# Patient Record
Sex: MALE | Race: OTHER | NOT HISPANIC OR LATINO | URBAN - METROPOLITAN AREA
[De-identification: names, ages, dates, MRNs, and addresses within clinical notes are randomized per-mention and may not be internally consistent; named-entity substitution may affect disease eponyms.]

---

## 2017-02-27 ENCOUNTER — EMERGENCY (EMERGENCY)
Facility: HOSPITAL | Age: 37
LOS: 0 days | Discharge: HOME | End: 2017-02-27

## 2017-06-27 DIAGNOSIS — J11.1 INFLUENZA DUE TO UNIDENTIFIED INFLUENZA VIRUS WITH OTHER RESPIRATORY MANIFESTATIONS: ICD-10-CM

## 2017-06-27 DIAGNOSIS — R50.9 FEVER, UNSPECIFIED: ICD-10-CM

## 2017-06-27 DIAGNOSIS — R42 DIZZINESS AND GIDDINESS: ICD-10-CM

## 2019-01-17 PROBLEM — Z00.00 ENCOUNTER FOR PREVENTIVE HEALTH EXAMINATION: Status: ACTIVE | Noted: 2019-01-17

## 2019-01-28 ENCOUNTER — APPOINTMENT (OUTPATIENT)
Dept: UROLOGY | Facility: CLINIC | Age: 39
End: 2019-01-28

## 2023-01-17 ENCOUNTER — NEW PATIENT COMPREHENSIVE (OUTPATIENT)
Dept: URBAN - METROPOLITAN AREA CLINIC 10 | Facility: CLINIC | Age: 43
End: 2023-01-17

## 2023-01-17 DIAGNOSIS — H31.012: ICD-10-CM

## 2023-01-17 DIAGNOSIS — H01.023: ICD-10-CM

## 2023-01-17 DIAGNOSIS — H01.026: ICD-10-CM

## 2023-01-17 DIAGNOSIS — H00.16: ICD-10-CM

## 2023-01-17 DIAGNOSIS — Z13.5: ICD-10-CM

## 2023-01-17 PROCEDURE — 92134 CPTRZ OPH DX IMG PST SGM RTA: CPT

## 2023-01-17 PROCEDURE — MISCOPTOS MISCELLANEOUS, OPTOS

## 2023-01-17 PROCEDURE — 99204 OFFICE O/P NEW MOD 45 MIN: CPT

## 2023-01-17 ASSESSMENT — VISUAL ACUITY
OD_SC: 20/20-2
OS_SC: 20/30

## 2023-01-17 ASSESSMENT — KERATOMETRY
OS_AXISANGLE_DEGREES: 22
OD_AXISANGLE2_DEGREES: 88
OS_AXISANGLE2_DEGREES: 112
OD_AXISANGLE_DEGREES: 178
OD_K2POWER_DIOPTERS: 44.00
OS_K1POWER_DIOPTERS: 42.25
OS_K2POWER_DIOPTERS: 43.50
OD_K1POWER_DIOPTERS: 42.75

## 2023-01-17 ASSESSMENT — TONOMETRY
OD_IOP_MMHG: 14
OS_IOP_MMHG: 14

## 2023-01-31 ENCOUNTER — CONSULTATION (OUTPATIENT)
Dept: URBAN - METROPOLITAN AREA CLINIC 10 | Facility: CLINIC | Age: 43
End: 2023-01-31

## 2023-01-31 DIAGNOSIS — H01.023: ICD-10-CM

## 2023-01-31 DIAGNOSIS — H31.012: ICD-10-CM

## 2023-01-31 DIAGNOSIS — Z13.5: ICD-10-CM

## 2023-01-31 DIAGNOSIS — H01.026: ICD-10-CM

## 2023-01-31 DIAGNOSIS — H00.16: ICD-10-CM

## 2023-01-31 PROCEDURE — 92012 INTRM OPH EXAM EST PATIENT: CPT | Mod: 25

## 2023-01-31 PROCEDURE — 67800 REMOVE EYELID LESION: CPT

## 2023-01-31 ASSESSMENT — KERATOMETRY
OS_AXISANGLE2_DEGREES: 112
OS_K1POWER_DIOPTERS: 42.25
OS_K2POWER_DIOPTERS: 43.50
OD_K2POWER_DIOPTERS: 44.00
OS_AXISANGLE_DEGREES: 22
OD_K1POWER_DIOPTERS: 42.75
OD_AXISANGLE_DEGREES: 178
OD_AXISANGLE2_DEGREES: 88

## 2023-01-31 ASSESSMENT — VISUAL ACUITY
OD_SC: 20/20-1
OS_SC: 20/25-1

## 2024-04-18 ENCOUNTER — APPOINTMENT (OUTPATIENT)
Dept: ORTHOPEDIC SURGERY | Facility: CLINIC | Age: 44
End: 2024-04-18
Payer: COMMERCIAL

## 2024-04-18 VITALS — HEIGHT: 70 IN

## 2024-04-18 DIAGNOSIS — M72.2 PLANTAR FASCIAL FIBROMATOSIS: ICD-10-CM

## 2024-04-18 PROCEDURE — 99204 OFFICE O/P NEW MOD 45 MIN: CPT

## 2024-04-18 RX ORDER — FAMOTIDINE 20 MG/1
20 TABLET, FILM COATED ORAL
Qty: 60 | Refills: 0 | Status: ACTIVE | COMMUNITY
Start: 2024-04-18 | End: 1900-01-01

## 2024-04-18 RX ORDER — IBUPROFEN 800 MG/1
800 TABLET, FILM COATED ORAL
Qty: 60 | Refills: 2 | Status: ACTIVE | COMMUNITY
Start: 2024-04-18 | End: 1900-01-01

## 2024-04-18 NOTE — IMAGING
[de-identified] : Pleasant early middle-aged gentleman walks into my office no distress.  He walks with no limp or antalgia.  Physical examination: Left shoulder: Unremarkable examination.  Normal speeds test.  Normal Breckinridge's test.  Normal impingement sign.  Normal Brasher test.  Normal lift off test.  Negative apprehension test.  Active forward flexion is symmetric to 170 degrees.  Abduction symmetric 165 degrees.  With the shoulder held at 90 degrees of abduction external rotation 85 degrees internal rotation 70 degrees symmetric.  No axillary lymph nodes.  Some tenderness palpation over the angle of his scapula but most of the tenderness is medial to the scapula if any.  No tenderness along the acromial arch or clavicle or AC joint.  Negative cross chest test.  No undue swelling.  Left hip: No tenderness palpation over his inguinal crease.  No pain with forced internal/external rotation of his hip joint at full extension 90 degrees of flexion.  No lymph nodes inguinal crease.  Flexion to 100 degrees without pain.  Provocative testing iliotibial band elicits discomfort.  Tenderness to palpation over the trochanteric bursa.  Left foot: Tenderness palpation along the plantar fascia along the arch of the patient's foot.  No tenderness along the navicular nor posterior aspects of the calcaneus.  Minimal tenderness over the calcaneal tuberosity.  No dorsal tenderness along the midfoot.  Arch maintained.  Ankle motion full.  Good capillary refill.  No callosities ulcers.  No undue swelling.  Normal sensation.

## 2024-04-18 NOTE — ASSESSMENT
[FreeTextEntry1] : 43-year-old gentleman with several complaints.  I think his medial shoulder pain is as the neurologist suggested probably some irritation or impingement of sensory nerve either coming from his lower cervical spine or thoracic spine.  I would defer to neurology about management of this.  Perhaps physical therapy will helpful but will defer to them to write this prescription.  He does not have anything intrinsically wrong with his shoulder by clinical exam.  Regarding his left hip pain this is really lateral hip pain and is most consistent with trochanteric bursitis and iliotibial band syndrome.  Talked about treatment options.  I am giving him a prescription for NSAIDs with famotidine and physical therapy.  Will have him follow-up in 3 months time.  If the pain persist can consider cortisone injection.  His foot pain is most consistent with a plantar fasciitis.  I am not a big fan of cortisone injections in the area of discomfort.  I explained to him the use of Achilles tendon stretches and plantar fascial massage and are shown in both these activities.  I have also given a prescription for physical therapy for these stretches.  Will have him follow-up in my office in 3 months time.  Will obtain radiographs of the left hip on follow-up.  We can also obtain bilateral weightbearing foot x-rays if he is still having foot pain on follow-up.

## 2024-04-18 NOTE — HISTORY OF PRESENT ILLNESS
[de-identified] : 43-year-old  wears regular shoewear for work and goes from site to site has had the better part of 6 months to a year of left posterior shoulder and neck pain.  This pain is occasionally paresthetic in nature.  He has been worked up with an MRI of his cervical spine and has seen a neurologist.  He was told that he is foraminal encroachment with pinching of nerves largely account for this pain.  Patient has had lateral left hip and thigh pain starting probably about a year ago but most significantly over the last couple of months.  Describes this pain as achy and sometimes paresthetic.  Pain is worse at night.  He is tried Advil which mitigates but does not alleviate the pain.  He also has left plantar foot pain over about the same period of time.  He has seen a podiatrist who suggested possible plantar fasciitis for which he recommended steroids.  May have some unsteadiness of gait.  No unexplained weight loss or weight gain.  No trouble swallowing.  No fevers chills night sweats or other constitutional symptoms.  Does not walk with a cane or assistive device.  He is not dropping objects or having difficulty working on a keyboard.  Past medical history: Hypertension  Medications: Amlodipine/losartan  Allergies: Penicillin (child-reaction unknown)  Social: , , lives with wife no cigarette smoke social EtOH

## 2024-04-18 NOTE — REASON FOR VISIT
[FreeTextEntry2] : Presents for evaluation of chronic neck and posterior left shoulder pain, left lateral thigh pain and left foot pain.

## 2024-05-02 ENCOUNTER — APPOINTMENT (OUTPATIENT)
Dept: ORTHOPEDIC SURGERY | Facility: CLINIC | Age: 44
End: 2024-05-02
Payer: COMMERCIAL

## 2024-05-02 PROCEDURE — 73630 X-RAY EXAM OF FOOT: CPT | Mod: 50

## 2024-05-02 PROCEDURE — 73502 X-RAY EXAM HIP UNI 2-3 VIEWS: CPT

## 2024-05-02 NOTE — ASSESSMENT
[FreeTextEntry1] : 43-year-old gentleman.  Although he does have some mild arthritic changes of the left hip I do not think this is the major  of his pain.  Although he may also have some trochanteric bursitis and think the underlying problem is his generalized stiffness and for that I will give him physical therapy.  Physical therapy to focus on iliotibial band stretching and hamstring stretching hip abductor and extensor stretching quadricep stretching and generalized stretching as well as core strengthening exercises.  We can also help arrange for an appointment with neurology to obtain the EMGs.  He will follow-up in my office after he has been evaluated by neurology and after has had some physical therapy.

## 2024-05-02 NOTE — HISTORY OF PRESENT ILLNESS
[de-identified] : 43-year-old  returns for interval follow-up.  Previously seen is felt to have radicular low back pain.  Was seen by neurosurgeon retained MRIs all of which were normal.  He is neurosurgeon recommended further workup with neurology for EMGs of the neck and lower back.  Patient is can to be complaints of pain radiating down the back of his legs to his feet with paresthesias and occasional lateral left hip pain.  Although he does have some groin pain from time to time this is not his major complaint.  He walks without a cane or assistive device.

## 2024-05-02 NOTE — IMAGING
[de-identified] : Mae middle-age gentleman walks into my office with no antalgia.  Physical examination: Left hip: No tenderness palpation inguinal crease.  Minimal if any tenderness of the trochanteric bursa.  He has extremely tight hamstrings.  His iliotibial band is tight.  No pain with forced internal/external rotation of joint at full extension or 90 degrees of flexion with neutral abduction.  No lymph nodes inguinal crease.  Radiographs: Left hip (AP, lateral): Mild joint space narrowing of the left hip.  No subchondral sclerotic or cystic changes.  No para-articular erosive changes.  No marginal osteophytes.

## 2024-05-17 ENCOUNTER — APPOINTMENT (OUTPATIENT)
Dept: NEUROLOGY | Facility: CLINIC | Age: 44
End: 2024-05-17
Payer: COMMERCIAL

## 2024-05-17 PROCEDURE — 95912 NRV CNDJ TEST 11-12 STUDIES: CPT

## 2024-05-17 PROCEDURE — 95886 MUSC TEST DONE W/N TEST COMP: CPT

## 2024-05-23 ENCOUNTER — APPOINTMENT (OUTPATIENT)
Dept: ORTHOPEDIC SURGERY | Facility: CLINIC | Age: 44
End: 2024-05-23
Payer: COMMERCIAL

## 2024-05-23 DIAGNOSIS — R07.81 PLEURODYNIA: ICD-10-CM

## 2024-05-23 DIAGNOSIS — M25.552 PAIN IN LEFT HIP: ICD-10-CM

## 2024-05-23 DIAGNOSIS — G89.29 PAIN IN LEFT HIP: ICD-10-CM

## 2024-05-23 PROCEDURE — 99212 OFFICE O/P EST SF 10 MIN: CPT

## 2024-05-23 NOTE — HISTORY OF PRESENT ILLNESS
[de-identified] : 43-year-old gentleman previously seen by me for radicular low back pain.  Sent for EMGs.  Also receiving physical therapy.  He finds physical therapy very helpful.  Has noted some improvement his low back pain.  During this time he is also had a resumption of some posterior lateral left rib pain.  This is positional and achy in nature but occasionally will occur without change in position.  Quality of the pain suggest further entrapment nerves.  Denies any respiratory distress.  No bowel or bladder disturbances or unremitting night pain.

## 2024-05-23 NOTE — REASON FOR VISIT
[FreeTextEntry2] : Returns for follow-up of radicular low back pain; resumption of prior posterior left rib pain

## 2024-05-23 NOTE — ASSESSMENT
[FreeTextEntry1] : 43-year-old gentleman with radicular low back pain without clear evidence of entrapped nerve.  Clinical examination suggest otherwise.  Recommend continued physical therapy lower back.  Will have him continue with this and have him follow-up for his low back issues in 3 months.  Also has complaints of thoracic pain.  Suspect Condor carditis.  Because of location will obtain a CAT scan of his chest to rule out any pulmonary or left renal pathology.  Will see him back after he gets the CAT scan.  Will review the findings with musculoskeletal radiology.

## 2024-05-23 NOTE — IMAGING
[de-identified] : Pleasant middle-age gentleman sits comfortably in office.  Physical examination: Lumbar spine: Hamstring tightness is improved.  Motor strength remains 5 out of 5.  Less paraspinal spasming on the left side.  No bony tenderness.  Range of motion improving.  Thoracic spine: No focal tenderness along the bony prominences.  No tenderness along the rib cage with the patient localizes tenderness below his scapular angle   EMG report lumbar spine reviewed.  No evidence of entrapment.

## 2024-06-14 ENCOUNTER — APPOINTMENT (OUTPATIENT)
Dept: NEUROLOGY | Facility: CLINIC | Age: 44
End: 2024-06-14
Payer: COMMERCIAL

## 2024-06-14 PROCEDURE — 95886 MUSC TEST DONE W/N TEST COMP: CPT

## 2024-06-14 PROCEDURE — 95912 NRV CNDJ TEST 11-12 STUDIES: CPT

## 2024-07-15 ENCOUNTER — APPOINTMENT (OUTPATIENT)
Dept: PAIN MANAGEMENT | Facility: CLINIC | Age: 44
End: 2024-07-15
Payer: OTHER MISCELLANEOUS

## 2024-07-15 DIAGNOSIS — M46.1 SACROILIITIS, NOT ELSEWHERE CLASSIFIED: ICD-10-CM

## 2024-07-15 DIAGNOSIS — M79.18 MYALGIA, OTHER SITE: ICD-10-CM

## 2024-07-15 PROCEDURE — 99204 OFFICE O/P NEW MOD 45 MIN: CPT

## 2024-07-16 ENCOUNTER — APPOINTMENT (OUTPATIENT)
Dept: ORTHOPEDIC SURGERY | Facility: CLINIC | Age: 44
End: 2024-07-16

## 2024-07-18 ENCOUNTER — CONSULTATION (OUTPATIENT)
Dept: URBAN - METROPOLITAN AREA CLINIC 10 | Facility: CLINIC | Age: 44
End: 2024-07-18

## 2024-07-18 DIAGNOSIS — H00.16: ICD-10-CM

## 2024-07-18 DIAGNOSIS — D49.2: ICD-10-CM

## 2024-07-18 DIAGNOSIS — H01.023: ICD-10-CM

## 2024-07-18 DIAGNOSIS — H02.879: ICD-10-CM

## 2024-07-18 DIAGNOSIS — H01.026: ICD-10-CM

## 2024-07-18 PROCEDURE — 92285 EXTERNAL OCULAR PHOTOGRAPHY: CPT

## 2024-07-18 PROCEDURE — 99204 OFFICE O/P NEW MOD 45 MIN: CPT

## 2024-07-18 ASSESSMENT — VISUAL ACUITY
OS_SC: 20/20-2
OD_SC: 20/20-1

## 2024-08-12 ENCOUNTER — APPOINTMENT (OUTPATIENT)
Dept: PAIN MANAGEMENT | Facility: CLINIC | Age: 44
End: 2024-08-12
Payer: OTHER MISCELLANEOUS

## 2024-08-12 DIAGNOSIS — M25.519 PAIN IN UNSPECIFIED SHOULDER: ICD-10-CM

## 2024-08-12 DIAGNOSIS — M54.12 RADICULOPATHY, CERVICAL REGION: ICD-10-CM

## 2024-08-12 PROCEDURE — 20552 NJX 1/MLT TRIGGER POINT 1/2: CPT | Mod: LT

## 2024-08-12 PROCEDURE — 99214 OFFICE O/P EST MOD 30 MIN: CPT | Mod: 25

## 2024-08-12 NOTE — DISCUSSION/SUMMARY
[de-identified] : A discussion regarding available pain management treatment options occurred with the patient. These included interventional, rehabilitative, pharmacological, and alternative modalities. We will proceed with the following:   Interventional treatment options: 1. MRI of the left shoulder ordered to rule out any internal derangement.  2. Patient has documented myofascial spasms and trigger points in the muscle. Patient has trialed rehab (Home exercise, physical therapy or chiropractic care) and medications. We will proceed with a series of 1-3 trapezius and thoracic trigger points in office today. If 50% or greater relief for 6-8 weeks another can be repeated vs Botox.  Risks with the procedure such as bleeding and infection was discussed in detail.   Rehabilitative options: - c/w chiropractic therapy.  - Physical therapy of the cervical spine 2-3 times a week for 4-8 weeks stressing a home exercise program of walking, shoulder griddle strengthening, swimming, elliptical , recumbent bike, Kiko chi and Yoga. Use things that heat like hot shower or icy heat before rehab, exercising and at the beginning of the day, and ice (ice in a bag never directly on the skin) after activity and at the end of the day. (New script given at today's visit) - c/w participation in active HEP as discussed and printed. I gave the patient a list of home exercises to do for pain reduction and overall improvement in functional status including but not limited to active neck rotation, active neck side bend, neck flexion, neck extension, chin tuck, scalene stretch, isometric neck flexion, isometric neck extension, isometric neck side bend, head lift/neck curl, head lift/neck side bend, neck extension on hands and knees, and scapula squeeze.  Complementary treatment options: - Patient was advised to stay away from any heavy lifting. If needed, he was advised to squat and not bend forward. - Physician directed activity and lifestyle modifications.  Follow up in 4-6 weeks for reassessment.  I, Miguelangel Martinez, attest that this documentation has been prepared under the direction and in the presence of Provider Medhat Elise, DO The documentation recorded by the scribe, in my presence, accurately reflects the service I personally performed, and the decisions made by me with my edits as appropriate.   Best Regards, Medhat Elise D.O.

## 2024-08-13 ENCOUNTER — APPOINTMENT (OUTPATIENT)
Dept: ORTHOPEDIC SURGERY | Facility: CLINIC | Age: 44
End: 2024-08-13

## 2024-09-05 ENCOUNTER — APPOINTMENT (OUTPATIENT)
Dept: PAIN MANAGEMENT | Facility: CLINIC | Age: 44
End: 2024-09-05
Payer: OTHER MISCELLANEOUS

## 2024-09-05 DIAGNOSIS — M54.81 OCCIPITAL NEURALGIA: ICD-10-CM

## 2024-09-05 PROCEDURE — 99214 OFFICE O/P EST MOD 30 MIN: CPT | Mod: 25

## 2024-09-05 PROCEDURE — 20552 NJX 1/MLT TRIGGER POINT 1/2: CPT | Mod: LT

## 2024-09-05 NOTE — DISCUSSION/SUMMARY
[de-identified] : A discussion regarding available pain management treatment options occurred with the patient. These included interventional, rehabilitative, pharmacological, and alternative modalities. We will proceed with the following:   Interventional treatment options: 1. Patient has documented myofascial spasms and trigger points in the muscle. Patient has trialed rehab (Home exercise, physical therapy or chiropractic care) and medications. We will proceed with a series of 1-3 thoracic trigger points in office today. If 50% or greater relief for 6-8 weeks another can be repeated vs Botox.  Risks with the procedure such as bleeding and infection was discussed in detail.   2. Patient will proceed with a left occipital nerve block injection. Risks with the procedure such as bleeding and infection was discussed in detail.  Rehabilitative options: - c/w chiropractic therapy.  - Physical therapy of the cervical spine 2-3 times a week for 4-8 weeks stressing a home exercise program of walking, shoulder griddle strengthening, swimming, elliptical , recumbent bike, Kiko chi and Yoga. Use things that heat like hot shower or icy heat before rehab, exercising and at the beginning of the day, and ice (ice in a bag never directly on the skin) after activity and at the end of the day. (New script given at today's visit) - c/w participation in active HEP as discussed and printed. I gave the patient a list of home exercises to do for pain reduction and overall improvement in functional status including but not limited to active neck rotation, active neck side bend, neck flexion, neck extension, chin tuck, scalene stretch, isometric neck flexion, isometric neck extension, isometric neck side bend, head lift/neck curl, head lift/neck side bend, neck extension on hands and knees, and scapula squeeze.  Complementary treatment options: - Patient was advised to stay away from any heavy lifting. If needed, he was advised to squat and not bend forward. - Physician directed activity and lifestyle modifications.  Follow up in 4-6 weeks for reassessment.  I, Miguelangel Martinez, attest that this documentation has been prepared under the direction and in the presence of Provider Medhat Elise, DO The documentation recorded by the scribe, in my presence, accurately reflects the service I personally performed, and the decisions made by me with my edits as appropriate.   Best Regards, Medhat Elise D.O.

## 2024-09-05 NOTE — DISCUSSION/SUMMARY
[de-identified] : A discussion regarding available pain management treatment options occurred with the patient. These included interventional, rehabilitative, pharmacological, and alternative modalities. We will proceed with the following:   Interventional treatment options: 1. Patient has documented myofascial spasms and trigger points in the muscle. Patient has trialed rehab (Home exercise, physical therapy or chiropractic care) and medications. We will proceed with a series of 1-3 thoracic trigger points in office today. If 50% or greater relief for 6-8 weeks another can be repeated vs Botox.  Risks with the procedure such as bleeding and infection was discussed in detail.   2. Patient will proceed with a left occipital nerve block injection. Risks with the procedure such as bleeding and infection was discussed in detail.  Rehabilitative options: - c/w chiropractic therapy.  - Physical therapy of the cervical spine 2-3 times a week for 4-8 weeks stressing a home exercise program of walking, shoulder griddle strengthening, swimming, elliptical , recumbent bike, Kiko chi and Yoga. Use things that heat like hot shower or icy heat before rehab, exercising and at the beginning of the day, and ice (ice in a bag never directly on the skin) after activity and at the end of the day. (New script given at today's visit) - c/w participation in active HEP as discussed and printed. I gave the patient a list of home exercises to do for pain reduction and overall improvement in functional status including but not limited to active neck rotation, active neck side bend, neck flexion, neck extension, chin tuck, scalene stretch, isometric neck flexion, isometric neck extension, isometric neck side bend, head lift/neck curl, head lift/neck side bend, neck extension on hands and knees, and scapula squeeze.  Complementary treatment options: - Patient was advised to stay away from any heavy lifting. If needed, he was advised to squat and not bend forward. - Physician directed activity and lifestyle modifications.  Follow up in 4-6 weeks for reassessment.  I, Miguelangel Martinez, attest that this documentation has been prepared under the direction and in the presence of Provider Medhat Elise, DO The documentation recorded by the scribe, in my presence, accurately reflects the service I personally performed, and the decisions made by me with my edits as appropriate.   Best Regards, Medhat Elise D.O.
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
## 2024-09-05 NOTE — PHYSICAL EXAM
[de-identified] :  Spurling +LT.  ttp to left traps and cervical and thoracic paraspinals tinels + left greater occipital nerve block

## 2024-09-05 NOTE — PROCEDURE
[Trigger point 1-2 muscle groups] : trigger point 1-2 muscle groups [Left] : of the left [Pain] : pain [Alcohol] : alcohol [___ cc    0.25%] : Bupivacaine (Marcaine) ~Vcc of 0.25%  [___ cc    4mg] : Dexamethasone (Decadron) ~Vcc of 4 mg  [Call if redness, pain or fever occur] : call if redness, pain or fever occur [Apply ice for 15min out of every hour for the next 12-24 hours as tolerated] : apply ice for 15 minutes out of every hour for the next 12-24 hours as tolerated [Previous OTC use and PT nontherapeutic] : patient has tried OTC's including aspirin, Ibuprofen, Aleve, etc or prescription NSAIDS, and/or exercises at home and/or physical therapy without satisfactory response [Risks, benefits, alternatives discussed / Verbal consent obtained] : the risks benefits, and alternatives have been discussed, and verbal consent was obtained [Thoracic paraspinal muscle] : thoracic paraspinal muscle

## 2024-09-05 NOTE — PHYSICAL EXAM
[de-identified] :  Spurling +LT.  ttp to left traps and cervical and thoracic paraspinals tinels + left greater occipital nerve block

## 2024-09-23 ENCOUNTER — APPOINTMENT (OUTPATIENT)
Dept: PAIN MANAGEMENT | Facility: CLINIC | Age: 44
End: 2024-09-23
Payer: OTHER MISCELLANEOUS

## 2024-09-23 DIAGNOSIS — M79.18 MYALGIA, OTHER SITE: ICD-10-CM

## 2024-09-23 DIAGNOSIS — M54.81 OCCIPITAL NEURALGIA: ICD-10-CM

## 2024-09-23 PROCEDURE — 20552 NJX 1/MLT TRIGGER POINT 1/2: CPT

## 2024-09-23 PROCEDURE — 99214 OFFICE O/P EST MOD 30 MIN: CPT | Mod: 25

## 2024-09-23 NOTE — DISCUSSION/SUMMARY
[de-identified] : A discussion regarding available pain management treatment options occurred with the patient. These included interventional, rehabilitative, pharmacological, and alternative modalities. We will proceed with the following:   Interventional treatment options: 1. Patient has documented cervical myofascial spasms and trigger points in the muscle. Patient has trialed rehab (Home exercise, physical therapy or chiropractic care) and medications. We will proceed with a series of 1-3 trapezius and thoracic trigger points in office today. If 50% or greater relief for 6-8 weeks another can be repeated vs Botox.  Risks with the procedure such as bleeding and infection was discussed in detail. 2. Patient will proceed with a left occipital nerve block injection. Risks with the procedure such as bleeding and infection was discussed in detail.   Rehabilitative options: - c/w chiropractic therapy.  - Physical therapy of the cervical spine 2-3 times a week for 4-8 weeks stressing a home exercise program of walking, shoulder griddle strengthening, swimming, elliptical , recumbent bike, Kiko chi and Yoga. Use things that heat like hot shower or icy heat before rehab, exercising and at the beginning of the day, and ice (ice in a bag never directly on the skin) after activity and at the end of the day. (New script given at today's visit) - c/w participation in active HEP as discussed and printed. I gave the patient a list of home exercises to do for pain reduction and overall improvement in functional status including but not limited to active neck rotation, active neck side bend, neck flexion, neck extension, chin tuck, scalene stretch, isometric neck flexion, isometric neck extension, isometric neck side bend, head lift/neck curl, head lift/neck side bend, neck extension on hands and knees, and scapula squeeze.  Complementary treatment options: - Patient was advised to stay away from any heavy lifting. If needed, he was advised to squat and not bend forward. - Physician directed activity and lifestyle modifications.  Follow up in 4-6 weeks for reassessment.  08-Jan-2023 17:55
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
## 2024-09-23 NOTE — PROCEDURE
[Trigger point 1-2 muscle groups] : trigger point 1-2 muscle groups [Left] : of the left [Pain] : pain [Alcohol] : alcohol [___ cc    0.25%] : Bupivacaine (Marcaine) ~Vcc of 0.25%  [___ cc    4mg] : Dexamethasone (Decadron) ~Vcc of 4 mg  [Call if redness, pain or fever occur] : call if redness, pain or fever occur [Apply ice for 15min out of every hour for the next 12-24 hours as tolerated] : apply ice for 15 minutes out of every hour for the next 12-24 hours as tolerated [Previous OTC use and PT nontherapeutic] : patient has tried OTC's including aspirin, Ibuprofen, Aleve, etc or prescription NSAIDS, and/or exercises at home and/or physical therapy without satisfactory response [Risks, benefits, alternatives discussed / Verbal consent obtained] : the risks benefits, and alternatives have been discussed, and verbal consent was obtained [Cervical paraspinal muscle] : cervical paraspinal muscle [Trapezius muscle] : trapezius muscle

## 2024-10-04 ENCOUNTER — APPOINTMENT (OUTPATIENT)
Dept: PAIN MANAGEMENT | Facility: CLINIC | Age: 44
End: 2024-10-04

## 2024-11-15 ENCOUNTER — APPOINTMENT (OUTPATIENT)
Dept: PAIN MANAGEMENT | Facility: CLINIC | Age: 44
End: 2024-11-15
Payer: OTHER MISCELLANEOUS

## 2024-11-15 DIAGNOSIS — M54.81 OCCIPITAL NEURALGIA: ICD-10-CM

## 2024-11-15 PROCEDURE — 64405 NJX AA&/STRD GR OCPL NRV: CPT | Mod: LT

## 2024-11-27 ENCOUNTER — APPOINTMENT (OUTPATIENT)
Facility: CLINIC | Age: 44
End: 2024-11-27

## 2024-11-27 ENCOUNTER — APPOINTMENT (OUTPATIENT)
Dept: PAIN MANAGEMENT | Facility: CLINIC | Age: 44
End: 2024-11-27

## 2024-12-06 ENCOUNTER — ESTABLISHED (OUTPATIENT)
Dept: URBAN - METROPOLITAN AREA CLINIC 10 | Facility: CLINIC | Age: 44
End: 2024-12-06

## 2024-12-06 DIAGNOSIS — H01.026: ICD-10-CM

## 2024-12-06 DIAGNOSIS — G43.111: ICD-10-CM

## 2024-12-06 DIAGNOSIS — D48.1: ICD-10-CM

## 2024-12-06 DIAGNOSIS — H00.16: ICD-10-CM

## 2024-12-06 DIAGNOSIS — H01.023: ICD-10-CM

## 2024-12-06 DIAGNOSIS — H02.879: ICD-10-CM

## 2024-12-06 PROCEDURE — 92285 EXTERNAL OCULAR PHOTOGRAPHY: CPT

## 2024-12-06 PROCEDURE — 99213 OFFICE O/P EST LOW 20 MIN: CPT

## 2024-12-06 ASSESSMENT — VISUAL ACUITY
OD_SC: 20/30+2
OS_SC: 20/20

## 2024-12-11 ENCOUNTER — APPOINTMENT (OUTPATIENT)
Dept: PAIN MANAGEMENT | Facility: CLINIC | Age: 44
End: 2024-12-11

## 2024-12-11 ENCOUNTER — APPOINTMENT (OUTPATIENT)
Facility: CLINIC | Age: 44
End: 2024-12-11

## 2024-12-13 ENCOUNTER — APPOINTMENT (OUTPATIENT)
Dept: PAIN MANAGEMENT | Facility: CLINIC | Age: 44
End: 2024-12-13

## 2025-01-07 ENCOUNTER — APPOINTMENT (OUTPATIENT)
Dept: PAIN MANAGEMENT | Facility: CLINIC | Age: 45
End: 2025-01-07

## 2025-01-15 ENCOUNTER — APPOINTMENT (OUTPATIENT)
Facility: CLINIC | Age: 45
End: 2025-01-15
Payer: OTHER MISCELLANEOUS

## 2025-01-15 ENCOUNTER — APPOINTMENT (OUTPATIENT)
Dept: PAIN MANAGEMENT | Facility: CLINIC | Age: 45
End: 2025-01-15
Payer: OTHER MISCELLANEOUS

## 2025-01-15 DIAGNOSIS — M54.12 RADICULOPATHY, CERVICAL REGION: ICD-10-CM

## 2025-01-15 PROCEDURE — 62321 NJX INTERLAMINAR CRV/THRC: CPT

## 2025-01-15 PROCEDURE — 00600 ANES PX CRV SPINE&CORD NOS: CPT | Mod: QZ,P2

## 2025-01-31 ENCOUNTER — APPOINTMENT (OUTPATIENT)
Dept: PAIN MANAGEMENT | Facility: CLINIC | Age: 45
End: 2025-01-31
Payer: OTHER MISCELLANEOUS

## 2025-01-31 DIAGNOSIS — M79.18 MYALGIA, OTHER SITE: ICD-10-CM

## 2025-01-31 DIAGNOSIS — M54.12 RADICULOPATHY, CERVICAL REGION: ICD-10-CM

## 2025-01-31 PROCEDURE — 99213 OFFICE O/P EST LOW 20 MIN: CPT

## 2025-01-31 PROCEDURE — 99214 OFFICE O/P EST MOD 30 MIN: CPT

## 2025-02-28 ENCOUNTER — APPOINTMENT (OUTPATIENT)
Dept: PAIN MANAGEMENT | Facility: CLINIC | Age: 45
End: 2025-02-28
Payer: OTHER MISCELLANEOUS

## 2025-02-28 DIAGNOSIS — M79.18 MYALGIA, OTHER SITE: ICD-10-CM

## 2025-02-28 DIAGNOSIS — M54.12 RADICULOPATHY, CERVICAL REGION: ICD-10-CM

## 2025-02-28 PROCEDURE — 99213 OFFICE O/P EST LOW 20 MIN: CPT

## 2025-03-25 ENCOUNTER — APPOINTMENT (OUTPATIENT)
Dept: ORTHOPEDIC SURGERY | Facility: CLINIC | Age: 45
End: 2025-03-25

## 2025-04-02 ENCOUNTER — APPOINTMENT (OUTPATIENT)
Facility: CLINIC | Age: 45
End: 2025-04-02
Payer: OTHER MISCELLANEOUS

## 2025-04-02 DIAGNOSIS — M46.1 SACROILIITIS, NOT ELSEWHERE CLASSIFIED: ICD-10-CM

## 2025-04-02 DIAGNOSIS — M79.18 MYALGIA, OTHER SITE: ICD-10-CM

## 2025-04-02 DIAGNOSIS — M54.12 RADICULOPATHY, CERVICAL REGION: ICD-10-CM

## 2025-04-02 PROCEDURE — 99204 OFFICE O/P NEW MOD 45 MIN: CPT

## 2025-04-11 ENCOUNTER — APPOINTMENT (OUTPATIENT)
Dept: PAIN MANAGEMENT | Facility: CLINIC | Age: 45
End: 2025-04-11

## 2025-04-25 ENCOUNTER — APPOINTMENT (OUTPATIENT)
Dept: PAIN MANAGEMENT | Facility: CLINIC | Age: 45
End: 2025-04-25
Payer: OTHER MISCELLANEOUS

## 2025-04-25 DIAGNOSIS — M54.12 RADICULOPATHY, CERVICAL REGION: ICD-10-CM

## 2025-04-25 DIAGNOSIS — M54.6 PAIN IN THORACIC SPINE: ICD-10-CM

## 2025-04-25 PROCEDURE — 99214 OFFICE O/P EST MOD 30 MIN: CPT

## 2025-04-25 RX ORDER — MELOXICAM 15 MG/1
15 TABLET ORAL DAILY
Qty: 30 | Refills: 0 | Status: ACTIVE | COMMUNITY
Start: 2025-04-25 | End: 1900-01-01

## 2025-05-13 DIAGNOSIS — M54.50 LOW BACK PAIN, UNSPECIFIED: ICD-10-CM

## 2025-05-16 ENCOUNTER — APPOINTMENT (OUTPATIENT)
Dept: PAIN MANAGEMENT | Facility: CLINIC | Age: 45
End: 2025-05-16
Payer: OTHER MISCELLANEOUS

## 2025-05-16 DIAGNOSIS — M79.18 MYALGIA, OTHER SITE: ICD-10-CM

## 2025-05-16 PROCEDURE — 20552 NJX 1/MLT TRIGGER POINT 1/2: CPT | Mod: LT

## 2025-06-10 ENCOUNTER — APPOINTMENT (OUTPATIENT)
Dept: UROLOGY | Facility: CLINIC | Age: 45
End: 2025-06-10
Payer: COMMERCIAL

## 2025-06-10 ENCOUNTER — NON-APPOINTMENT (OUTPATIENT)
Age: 45
End: 2025-06-10

## 2025-06-10 VITALS
DIASTOLIC BLOOD PRESSURE: 83 MMHG | SYSTOLIC BLOOD PRESSURE: 116 MMHG | WEIGHT: 215 LBS | BODY MASS INDEX: 30.78 KG/M2 | OXYGEN SATURATION: 96 % | TEMPERATURE: 97.7 F | HEART RATE: 82 BPM | HEIGHT: 70 IN

## 2025-06-10 PROBLEM — Z78.9 NON-SMOKER: Status: ACTIVE | Noted: 2025-06-10

## 2025-06-10 PROBLEM — N28.1 RENAL CYST, LEFT: Status: ACTIVE | Noted: 2025-06-10

## 2025-06-10 PROBLEM — Z78.9 SOCIAL ALCOHOL USE: Status: ACTIVE | Noted: 2025-06-10

## 2025-06-10 LAB
BILIRUB UR QL STRIP: NORMAL
COLLECTION METHOD: NORMAL
GLUCOSE UR-MCNC: NORMAL
HCG UR QL: 0.2 EU/DL
HGB UR QL STRIP.AUTO: NORMAL
KETONES UR-MCNC: NORMAL
LEUKOCYTE ESTERASE UR QL STRIP: NORMAL
NITRITE UR QL STRIP: NORMAL
PH UR STRIP: 6
PROT UR STRIP-MCNC: NORMAL
SP GR UR STRIP: 1.02

## 2025-06-10 PROCEDURE — 81003 URINALYSIS AUTO W/O SCOPE: CPT | Mod: QW

## 2025-06-10 PROCEDURE — 99203 OFFICE O/P NEW LOW 30 MIN: CPT

## 2025-06-20 ENCOUNTER — APPOINTMENT (OUTPATIENT)
Dept: PAIN MANAGEMENT | Facility: CLINIC | Age: 45
End: 2025-06-20

## 2025-08-14 ENCOUNTER — APPOINTMENT (OUTPATIENT)
Dept: NEUROLOGY | Facility: CLINIC | Age: 45
End: 2025-08-14
Payer: OTHER MISCELLANEOUS

## 2025-08-14 VITALS
HEART RATE: 78 BPM | HEIGHT: 70 IN | BODY MASS INDEX: 30.78 KG/M2 | SYSTOLIC BLOOD PRESSURE: 128 MMHG | WEIGHT: 215 LBS | DIASTOLIC BLOOD PRESSURE: 91 MMHG

## 2025-08-14 DIAGNOSIS — M54.81 OCCIPITAL NEURALGIA: ICD-10-CM

## 2025-08-14 DIAGNOSIS — M54.12 RADICULOPATHY, CERVICAL REGION: ICD-10-CM

## 2025-08-14 PROCEDURE — 99244 OFF/OP CNSLTJ NEW/EST MOD 40: CPT

## 2025-08-14 RX ORDER — SUMATRIPTAN 100 MG/1
100 TABLET, FILM COATED ORAL
Qty: 9 | Refills: 11 | Status: ACTIVE | COMMUNITY
Start: 2025-08-14 | End: 1900-01-01

## 2025-08-14 RX ORDER — GABAPENTIN 100 MG/1
100 CAPSULE ORAL 3 TIMES DAILY
Qty: 90 | Refills: 5 | Status: ACTIVE | COMMUNITY
Start: 2025-08-14 | End: 1900-01-01

## 2025-08-14 RX ORDER — OLMESARTAN MEDOXOMIL 20 MG/1
20 TABLET, FILM COATED ORAL
Refills: 0 | Status: ACTIVE | COMMUNITY

## 2025-09-04 ENCOUNTER — CONSULTATION (OUTPATIENT)
Dept: URBAN - METROPOLITAN AREA CLINIC 10 | Facility: CLINIC | Age: 45
End: 2025-09-04

## 2025-09-04 DIAGNOSIS — H35.362: ICD-10-CM

## 2025-09-04 DIAGNOSIS — D48.19: ICD-10-CM

## 2025-09-04 DIAGNOSIS — G43.111: ICD-10-CM

## 2025-09-04 DIAGNOSIS — H02.879: ICD-10-CM

## 2025-09-04 PROCEDURE — 92014 COMPRE OPH EXAM EST PT 1/>: CPT

## 2025-09-04 PROCEDURE — 92134 CPTRZ OPH DX IMG PST SGM RTA: CPT

## 2025-09-04 ASSESSMENT — TONOMETRY
OD_IOP_MMHG: 14
OS_IOP_MMHG: 12

## 2025-09-04 ASSESSMENT — VISUAL ACUITY
OD_SC: 20/30-2
OS_SC: 20/25-2
OU_SC: J1+

## 2025-09-05 ENCOUNTER — APPOINTMENT (OUTPATIENT)
Dept: PAIN MANAGEMENT | Facility: CLINIC | Age: 45
End: 2025-09-05
Payer: OTHER MISCELLANEOUS

## 2025-09-05 DIAGNOSIS — M79.18 MYALGIA, OTHER SITE: ICD-10-CM

## 2025-09-05 DIAGNOSIS — M54.81 OCCIPITAL NEURALGIA: ICD-10-CM

## 2025-09-05 PROCEDURE — 99214 OFFICE O/P EST MOD 30 MIN: CPT

## (undated) RX ORDER — NEOMYCIN SULFATE, POLYMYXIN B SULFATE AND DEXAMETHASONE 3.5; 10000; 1 MG/G; [USP'U]/G; MG/G: 1/4 OINTMENT OPHTHALMIC EVERY EVENING